# Patient Record
(demographics unavailable — no encounter records)

---

## 2024-10-07 NOTE — DISCUSSION/SUMMARY
[de-identified] : DIALLO CHACON is a 65 year old male with PMH of DM2, HTN and PSH of left knee meniscus surgery approximately 2 to 3 years ago presents today for atraumatic right sided medial knee pain consistent with knee osteoarthritis.  Plan: 1.  Referral given to physical therapy and home exercise program given 2.  Recommend Tylenol extra strength up to 2 pills 3-4 times a day 3.  Recommend Voltaren gel and other topical medicines as well as ice and heat 4.  Patient will follow-up in 2 to 3 months if no improvement can consider right knee CSI versus HA injection

## 2024-10-07 NOTE — HISTORY OF PRESENT ILLNESS
[de-identified] : DIALLO CHACON is a 65 year old male with PSH of left knee meniscus surgery approximately 2 to 3 years ago presents today for atraumatic right sided medial knee pain.  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  He has tried some topical medications over-the-counter that have not been helpful.  Denies any specific injury.  Feels the pain when he is on his feet or if he is sitting for too long and has to stand up feels like he needs to warm up to get moving.  Here today for further evaluation.

## 2024-10-07 NOTE — PHYSICAL EXAM
[de-identified] : Knee (right)   Inspection  Skin: normal  Effusion: normal  Bursa swelling: none   Palpation  Tenderness: Mild-moderate Location: Medial joint line  Crepitus: none.  Defect: none.  Popliteal fullness: negative.   Flexion  Active ROM: normal  Passive ROM: normal    Extension  Normal     Straight Leg Raise- can perform  Motor strength  Flexion: 5/5  Extension: 5/5   Sensory index  Normal.   ACL/PCL tests  Lachman test: stable  Anterior drawer: stable  Posterior drawer: stable   MCL/LCL tests  MCL laxity: stable  LCL laxity: stable   Patellofemoral tests  Patellar grind test: negative  Patellar apprehension: negative   Meniscal tests  Cristo's test: Positive medially Thessalys: Normal  [de-identified] : XR of right knee Date: 10/7/2024   Views: 4 views Performed at [facility]: Yes Impression: Mild to moderate medial and patellofemoral compartment osteoarthritis noted.  Well-circumscribed calcification noted in the distal patellar tendon.  Appears chronic.  These images were personally reviewed with original findings documented as above.